# Patient Record
Sex: FEMALE | Race: ASIAN | Employment: UNEMPLOYED | URBAN - METROPOLITAN AREA
[De-identification: names, ages, dates, MRNs, and addresses within clinical notes are randomized per-mention and may not be internally consistent; named-entity substitution may affect disease eponyms.]

---

## 2017-04-13 RX ORDER — ZOLPIDEM TARTRATE 10 MG/1
TABLET ORAL
Qty: 30 TABLET | Refills: 0 | Status: SHIPPED | OUTPATIENT
Start: 2017-04-13 | End: 2017-06-16

## 2017-04-18 ENCOUNTER — TELEPHONE (OUTPATIENT)
Dept: INTERNAL MEDICINE CLINIC | Facility: CLINIC | Age: 37
End: 2017-04-18

## 2017-04-18 NOTE — TELEPHONE ENCOUNTER
Please resend ZOLPIDEM TARTRATE 10 MG Oral Tab to Rohan Lyon the pharmacist pt called and they did not receive when sent previously

## 2017-04-18 NOTE — TELEPHONE ENCOUNTER
Spoke with pharmacy they never received Zolpidiem authorized on 04/13/17 ,Authorized to fill # [id number] dated 04/13/17

## 2017-06-17 RX ORDER — ZOLPIDEM TARTRATE 10 MG/1
TABLET ORAL
Qty: 30 TABLET | Refills: 0 | Status: SHIPPED | OUTPATIENT
Start: 2017-06-17 | End: 2017-10-09

## 2017-10-09 RX ORDER — ZOLPIDEM TARTRATE 10 MG/1
TABLET ORAL
Qty: 30 TABLET | Refills: 0 | Status: SHIPPED | OUTPATIENT
Start: 2017-10-09 | End: 2017-12-04

## 2017-12-04 RX ORDER — ZOLPIDEM TARTRATE 10 MG/1
TABLET ORAL
Qty: 30 TABLET | Refills: 0 | Status: SHIPPED | OUTPATIENT
Start: 2017-12-04 | End: 2018-02-11

## 2018-02-13 RX ORDER — ZOLPIDEM TARTRATE 10 MG/1
TABLET ORAL
Qty: 30 TABLET | Refills: 0 | Status: SHIPPED | OUTPATIENT
Start: 2018-02-13 | End: 2018-04-02

## 2018-04-02 RX ORDER — ZOLPIDEM TARTRATE 10 MG/1
TABLET ORAL
Qty: 30 TABLET | Refills: 0 | Status: SHIPPED | OUTPATIENT
Start: 2018-04-02 | End: 2019-07-15 | Stop reason: ALTCHOICE

## 2018-07-06 NOTE — TELEPHONE ENCOUNTER
Requesting ZOLPIDEM TARTRATE 10 MG Oral Tab     LOV: 09/03/2015    Filled: 04/02/2018 #30 with 0 refills

## 2018-07-09 RX ORDER — ZOLPIDEM TARTRATE 10 MG/1
TABLET ORAL
Qty: 30 TABLET | Refills: 0 | OUTPATIENT
Start: 2018-07-09

## 2018-07-16 RX ORDER — ZOLPIDEM TARTRATE 10 MG/1
TABLET ORAL
Qty: 30 TABLET | Refills: 0 | OUTPATIENT
Start: 2018-07-16

## 2018-07-18 NOTE — TELEPHONE ENCOUNTER
Patient called in inquiring about her medication refill as to why it has not been filled. Mentioned to pt that her LOV was 2015 and she will need to be seen prior to any refills getting authorized. Patient understood and then disconnected the call.

## 2019-07-15 PROBLEM — F51.01 PRIMARY INSOMNIA: Status: ACTIVE | Noted: 2019-07-15

## 2019-07-15 NOTE — PROGRESS NOTES
HPI:    Patient ID: Mahesh Estrada is a 46year old female.     HPI   Last appt 2015     Here to go over insomnia   Has used the zolpidem and not helping  Anymore   Asking for alternative med to try     otherwsie OK    Review of Systems           Current Ou

## 2019-07-30 ENCOUNTER — TELEPHONE (OUTPATIENT)
Dept: INTERNAL MEDICINE CLINIC | Facility: CLINIC | Age: 39
End: 2019-07-30

## 2019-07-30 ENCOUNTER — OFFICE VISIT (OUTPATIENT)
Dept: INTERNAL MEDICINE CLINIC | Facility: CLINIC | Age: 39
End: 2019-07-30
Payer: COMMERCIAL

## 2019-07-30 VITALS
OXYGEN SATURATION: 98 % | WEIGHT: 151 LBS | HEART RATE: 68 BPM | SYSTOLIC BLOOD PRESSURE: 122 MMHG | HEIGHT: 64 IN | BODY MASS INDEX: 25.78 KG/M2 | DIASTOLIC BLOOD PRESSURE: 86 MMHG | RESPIRATION RATE: 16 BRPM

## 2019-07-30 DIAGNOSIS — Z00.00 ROUTINE GENERAL MEDICAL EXAMINATION AT A HEALTH CARE FACILITY: Primary | ICD-10-CM

## 2019-07-30 DIAGNOSIS — Z00.00 LABORATORY EXAMINATION ORDERED AS PART OF A ROUTINE GENERAL MEDICAL EXAMINATION: ICD-10-CM

## 2019-07-30 DIAGNOSIS — Z23 NEED FOR TDAP VACCINATION: ICD-10-CM

## 2019-07-30 PROCEDURE — 90471 IMMUNIZATION ADMIN: CPT | Performed by: FAMILY MEDICINE

## 2019-07-30 PROCEDURE — 99395 PREV VISIT EST AGE 18-39: CPT | Performed by: FAMILY MEDICINE

## 2019-07-30 PROCEDURE — 87624 HPV HI-RISK TYP POOLED RSLT: CPT | Performed by: FAMILY MEDICINE

## 2019-07-30 PROCEDURE — 90715 TDAP VACCINE 7 YRS/> IM: CPT | Performed by: FAMILY MEDICINE

## 2019-07-30 NOTE — PROGRESS NOTES
HPI:   Jeo Ordaz is a 46year old female who presents for a complete physical exam. Symptoms: denies discharge, itching, burning or dysuria, periods are regular, flow is 6-7 days. Patient complains none.    Regular SBE- no,Sexually active- yes,  Contra Smoker      Smokeless tobacco: Never Used    Alcohol use: No      Alcohol/week: 0.0 standard drinks    Drug use: No    Occ: homemaker. : yes . Children: 2.    Exercise: minimal.  Diet: watches minimally     REVIEW OF SYSTEMS:   GENERAL: feels well ot handouts given for: exercise, low fat diet and breast self-exam. Pt' s weight is Body mass index is 25.92 kg/m². , recommended low fat diet and aerobic exercise 30 minutes three times weekly.   The patient indicates understanding of these issues and agrees t

## 2019-07-30 NOTE — TELEPHONE ENCOUNTER
Patient stated that Temazepam is not working and she is taking up to 2 pills at night with no results. She would like to to back to taking the Zolpidem Tartrate.      Please advise

## 2019-07-31 RX ORDER — ZOLPIDEM TARTRATE 12.5 MG/1
12.5 TABLET, FILM COATED, EXTENDED RELEASE ORAL NIGHTLY PRN
Qty: 30 TABLET | Refills: 0 | COMMUNITY
Start: 2019-07-31 | End: 2019-09-03

## 2019-08-02 LAB — HPV I/H RISK 1 DNA SPEC QL NAA+PROBE: NEGATIVE

## 2019-09-03 RX ORDER — ZOLPIDEM TARTRATE 12.5 MG/1
TABLET, FILM COATED, EXTENDED RELEASE ORAL
Qty: 30 TABLET | Refills: 0 | Status: SHIPPED | OUTPATIENT
Start: 2019-09-03 | End: 2019-10-01

## 2019-09-03 RX ORDER — ZOLPIDEM TARTRATE 12.5 MG/1
12.5 TABLET, FILM COATED, EXTENDED RELEASE ORAL NIGHTLY PRN
Qty: 30 TABLET | Refills: 0 | OUTPATIENT
Start: 2019-09-03

## 2019-10-02 RX ORDER — ZOLPIDEM TARTRATE 12.5 MG/1
12.5 TABLET, FILM COATED, EXTENDED RELEASE ORAL NIGHTLY PRN
Qty: 30 TABLET | Refills: 0 | Status: SHIPPED | OUTPATIENT
Start: 2019-10-02 | End: 2019-10-30

## 2019-10-02 NOTE — TELEPHONE ENCOUNTER
Last OV: 7/30/19 with Cyrilla NOEL Higginbotham  Last refill date: 9/3/19     #/refills: #30, 0 refills  When pt was asked to return for OV: 1 year  Upcoming appt/reason: no upcoming appt  Last labs May 2015

## 2019-10-30 RX ORDER — ZOLPIDEM TARTRATE 12.5 MG/1
TABLET, FILM COATED, EXTENDED RELEASE ORAL
Qty: 30 TABLET | Refills: 0 | Status: SHIPPED | OUTPATIENT
Start: 2019-10-30 | End: 2019-11-29

## 2019-10-30 NOTE — TELEPHONE ENCOUNTER
Zolpidem ER 12.5 MG  Last OV relevant to medication: 7-30-19  Last refill date: 10-2-19  #/refills: 0  When pt was asked to return for OV: CPX 1 yr  Upcoming appt/reason: none  Recent labs: none

## 2019-11-30 NOTE — TELEPHONE ENCOUNTER
Zolpidem ER 12.5 mg  Last OV relevant to medication: 7-30-19  Last refill date: 10-30-19 #/refills: 0  When pt was asked to return for OV: CPX 1yr  Upcoming appt/reason: none  Recent labs: none

## 2019-12-01 RX ORDER — ZOLPIDEM TARTRATE 12.5 MG/1
12.5 TABLET, FILM COATED, EXTENDED RELEASE ORAL NIGHTLY PRN
Qty: 30 TABLET | Refills: 0 | Status: SHIPPED | OUTPATIENT
Start: 2019-12-01 | End: 2019-12-25

## 2019-12-26 RX ORDER — ZOLPIDEM TARTRATE 12.5 MG/1
12.5 TABLET, FILM COATED, EXTENDED RELEASE ORAL NIGHTLY PRN
Qty: 30 TABLET | Refills: 0 | Status: SHIPPED | OUTPATIENT
Start: 2019-12-26 | End: 2020-01-27

## 2019-12-26 NOTE — TELEPHONE ENCOUNTER
Zolpidem Tartrate ER 12.5 MG Oral Tab CR     Last Ov: 7/30/2019   RTC: 1 year   Last Refill:12/1/2019 # 30 tabs with 0 refills

## 2020-01-27 RX ORDER — ZOLPIDEM TARTRATE 12.5 MG/1
12.5 TABLET, FILM COATED, EXTENDED RELEASE ORAL NIGHTLY PRN
Qty: 30 TABLET | Refills: 0 | Status: SHIPPED | OUTPATIENT
Start: 2020-01-27 | End: 2020-02-26

## 2020-01-27 NOTE — TELEPHONE ENCOUNTER
Zolpidem 12.5 Mg  Last OV relevant to medication: 7-3-19  Last refill date: 12-26-19 #/refills: 0  When pt was asked to return for OV: CPX 1yr  Upcoming appt/reason: none  Recent labs: none

## 2020-02-26 RX ORDER — ZOLPIDEM TARTRATE 12.5 MG/1
12.5 TABLET, FILM COATED, EXTENDED RELEASE ORAL NIGHTLY PRN
Qty: 30 TABLET | Refills: 0 | Status: SHIPPED | OUTPATIENT
Start: 2020-02-26 | End: 2020-03-25

## 2020-02-26 NOTE — TELEPHONE ENCOUNTER
Zolpidem Tartrate ER 12.5 MG Oral Tab CR    Last OV relevant to medication: 7/30/2019  Last refill date: 1/27/2020     #/refills: #30 w/ 0 refills   When pt was asked to return for OV: 1 year   Upcoming appt/reason: no future appointments

## 2020-03-25 RX ORDER — ZOLPIDEM TARTRATE 12.5 MG/1
12.5 TABLET, FILM COATED, EXTENDED RELEASE ORAL NIGHTLY PRN
Qty: 30 TABLET | Refills: 0 | Status: SHIPPED | OUTPATIENT
Start: 2020-03-25 | End: 2020-04-24

## 2020-03-25 NOTE — TELEPHONE ENCOUNTER
Zolpidem Tartrate ER 12.5 MG Oral Tab CR    Last OV relevant to medication: 7/30/2019  Last refill date: 2/26/2020     #/refills: #30 w/ 0 refills   When pt was asked to return for OV: 1 year   Upcoming appt/reason: no future appointments

## 2020-04-25 RX ORDER — ZOLPIDEM TARTRATE 12.5 MG/1
12.5 TABLET, FILM COATED, EXTENDED RELEASE ORAL NIGHTLY PRN
Qty: 30 TABLET | Refills: 0 | Status: SHIPPED | OUTPATIENT
Start: 2020-04-25 | End: 2020-05-20

## 2020-05-21 RX ORDER — ZOLPIDEM TARTRATE 12.5 MG/1
12.5 TABLET, FILM COATED, EXTENDED RELEASE ORAL NIGHTLY PRN
Qty: 30 TABLET | Refills: 1 | Status: SHIPPED | OUTPATIENT
Start: 2020-05-21 | End: 2020-06-23

## 2020-05-21 RX ORDER — ZOLPIDEM TARTRATE 12.5 MG/1
12.5 TABLET, FILM COATED, EXTENDED RELEASE ORAL NIGHTLY PRN
Qty: 30 TABLET | Refills: 0 | OUTPATIENT
Start: 2020-05-21

## 2020-06-24 RX ORDER — ZOLPIDEM TARTRATE 12.5 MG/1
12.5 TABLET, FILM COATED, EXTENDED RELEASE ORAL NIGHTLY PRN
Qty: 30 TABLET | Refills: 0 | Status: SHIPPED | OUTPATIENT
Start: 2020-06-24 | End: 2020-07-25

## 2020-06-24 NOTE — TELEPHONE ENCOUNTER
Zolpidem tartrate ER 12.5mg last filled 5/21/20 #30 with 1 refill    LOV 7/30/19     Upcoming appt 8/3/20

## 2020-07-23 RX ORDER — ZOLPIDEM TARTRATE 12.5 MG/1
12.5 TABLET, FILM COATED, EXTENDED RELEASE ORAL NIGHTLY PRN
Qty: 30 TABLET | Refills: 0 | OUTPATIENT
Start: 2020-07-23

## 2020-07-24 NOTE — TELEPHONE ENCOUNTER
Patient has an appointment for her physical on 08/03. Patient is out of her medication and would like it called in ASAP.

## 2020-07-25 RX ORDER — ZOLPIDEM TARTRATE 12.5 MG/1
12.5 TABLET, FILM COATED, EXTENDED RELEASE ORAL NIGHTLY PRN
Qty: 30 TABLET | Refills: 0 | Status: SHIPPED | OUTPATIENT
Start: 2020-07-25 | End: 2020-08-23

## 2020-07-25 NOTE — TELEPHONE ENCOUNTER
Pt has scheduled appointment    Future Appointments   Date Time Provider Christian Joy   8/3/2020  1:00 PM Penelope Barrow MD [facility] [place]

## 2020-08-03 ENCOUNTER — OFFICE VISIT (OUTPATIENT)
Dept: INTERNAL MEDICINE CLINIC | Facility: CLINIC | Age: 40
End: 2020-08-03
Payer: COMMERCIAL

## 2020-08-03 VITALS
HEART RATE: 68 BPM | BODY MASS INDEX: 25.7 KG/M2 | DIASTOLIC BLOOD PRESSURE: 78 MMHG | WEIGHT: 150.5 LBS | OXYGEN SATURATION: 99 % | HEIGHT: 64 IN | SYSTOLIC BLOOD PRESSURE: 120 MMHG | TEMPERATURE: 98 F | RESPIRATION RATE: 16 BRPM

## 2020-08-03 DIAGNOSIS — Z00.00 WELLNESS EXAMINATION: Primary | ICD-10-CM

## 2020-08-03 DIAGNOSIS — Z00.00 LABORATORY EXAM ORDERED AS PART OF ROUTINE GENERAL MEDICAL EXAMINATION: ICD-10-CM

## 2020-08-03 PROCEDURE — 3008F BODY MASS INDEX DOCD: CPT | Performed by: FAMILY MEDICINE

## 2020-08-03 PROCEDURE — 3074F SYST BP LT 130 MM HG: CPT | Performed by: FAMILY MEDICINE

## 2020-08-03 PROCEDURE — 99395 PREV VISIT EST AGE 18-39: CPT | Performed by: FAMILY MEDICINE

## 2020-08-03 PROCEDURE — 3078F DIAST BP <80 MM HG: CPT | Performed by: FAMILY MEDICINE

## 2020-08-03 RX ORDER — ZOLPIDEM TARTRATE 12.5 MG/1
12.5 TABLET, FILM COATED, EXTENDED RELEASE ORAL NIGHTLY PRN
Qty: 30 TABLET | Refills: 0 | Status: CANCELLED | OUTPATIENT
Start: 2020-08-03

## 2020-08-03 RX ORDER — TEMAZEPAM 15 MG/1
15 CAPSULE ORAL NIGHTLY PRN
Qty: 30 CAPSULE | Refills: 0 | Status: SHIPPED | OUTPATIENT
Start: 2020-08-03 | End: 2021-03-19 | Stop reason: ALTCHOICE

## 2020-08-03 NOTE — PROGRESS NOTES
HPI:   Kamran Jackson is a 45year old female who presents for a complete physical exam.     Wt Readings from Last 6 Encounters:  08/03/20 : 150 lb 8 oz (68.3 kg)  07/30/19 : 151 lb (68.5 kg)  07/15/19 : 152 lb (68.9 kg)  09/03/15 : 118 lb (53.5 kg)  04/30 bruising  ENDOCRINE: denies thyroid history  ALL/ASTHMA: denies hx of allergy or asthma    EXAM:   /78 (BP Location: Left arm, Patient Position: Sitting, Cuff Size: adult)   Pulse 68   Temp 98.3 °F (36.8 °C) (Oral)   Resp 16   Ht 64\"   Wt 150 lb 8 o

## 2020-08-06 ENCOUNTER — LAB ENCOUNTER (OUTPATIENT)
Dept: LAB | Age: 40
End: 2020-08-06
Attending: FAMILY MEDICINE
Payer: COMMERCIAL

## 2020-08-06 DIAGNOSIS — Z00.00 LABORATORY EXAM ORDERED AS PART OF ROUTINE GENERAL MEDICAL EXAMINATION: ICD-10-CM

## 2020-08-06 DIAGNOSIS — Z00.00 WELLNESS EXAMINATION: ICD-10-CM

## 2020-08-06 LAB
ALBUMIN SERPL-MCNC: 4 G/DL (ref 3.4–5)
ALBUMIN/GLOB SERPL: 1 {RATIO} (ref 1–2)
ALP LIVER SERPL-CCNC: 40 U/L (ref 37–98)
ALT SERPL-CCNC: 19 U/L (ref 13–56)
ANION GAP SERPL CALC-SCNC: 3 MMOL/L (ref 0–18)
AST SERPL-CCNC: 16 U/L (ref 15–37)
BASOPHILS # BLD AUTO: 0.06 X10(3) UL (ref 0–0.2)
BASOPHILS NFR BLD AUTO: 0.7 %
BILIRUB SERPL-MCNC: 0.5 MG/DL (ref 0.1–2)
BILIRUB UR QL STRIP.AUTO: NEGATIVE
BUN BLD-MCNC: 9 MG/DL (ref 7–18)
BUN/CREAT SERPL: 12.2 (ref 10–20)
CALCIUM BLD-MCNC: 9.6 MG/DL (ref 8.5–10.1)
CHLORIDE SERPL-SCNC: 106 MMOL/L (ref 98–112)
CHOLEST SMN-MCNC: 209 MG/DL (ref ?–200)
CLARITY UR REFRACT.AUTO: CLEAR
CO2 SERPL-SCNC: 27 MMOL/L (ref 21–32)
CREAT BLD-MCNC: 0.74 MG/DL (ref 0.55–1.02)
DEPRECATED RDW RBC AUTO: 40.3 FL (ref 35.1–46.3)
EOSINOPHIL # BLD AUTO: 0.04 X10(3) UL (ref 0–0.7)
EOSINOPHIL NFR BLD AUTO: 0.5 %
ERYTHROCYTE [DISTWIDTH] IN BLOOD BY AUTOMATED COUNT: 11.9 % (ref 11–15)
GLOBULIN PLAS-MCNC: 4.1 G/DL (ref 2.8–4.4)
GLUCOSE BLD-MCNC: 85 MG/DL (ref 70–99)
GLUCOSE UR STRIP.AUTO-MCNC: NEGATIVE MG/DL
HCT VFR BLD AUTO: 41.6 % (ref 35–48)
HDLC SERPL-MCNC: 48 MG/DL (ref 40–59)
HGB BLD-MCNC: 13.2 G/DL (ref 12–16)
IMM GRANULOCYTES # BLD AUTO: 0.02 X10(3) UL (ref 0–1)
IMM GRANULOCYTES NFR BLD: 0.2 %
KETONES UR STRIP.AUTO-MCNC: NEGATIVE MG/DL
LDLC SERPL CALC-MCNC: 150 MG/DL (ref ?–100)
LEUKOCYTE ESTERASE UR QL STRIP.AUTO: NEGATIVE
LYMPHOCYTES # BLD AUTO: 2.38 X10(3) UL (ref 1–4)
LYMPHOCYTES NFR BLD AUTO: 29 %
M PROTEIN MFR SERPL ELPH: 8.1 G/DL (ref 6.4–8.2)
MCH RBC QN AUTO: 29.3 PG (ref 26–34)
MCHC RBC AUTO-ENTMCNC: 31.7 G/DL (ref 31–37)
MCV RBC AUTO: 92.2 FL (ref 80–100)
MONOCYTES # BLD AUTO: 0.65 X10(3) UL (ref 0.1–1)
MONOCYTES NFR BLD AUTO: 7.9 %
NEUTROPHILS # BLD AUTO: 5.05 X10 (3) UL (ref 1.5–7.7)
NEUTROPHILS # BLD AUTO: 5.05 X10(3) UL (ref 1.5–7.7)
NEUTROPHILS NFR BLD AUTO: 61.7 %
NITRITE UR QL STRIP.AUTO: NEGATIVE
NONHDLC SERPL-MCNC: 161 MG/DL (ref ?–130)
OSMOLALITY SERPL CALC.SUM OF ELEC: 280 MOSM/KG (ref 275–295)
PATIENT FASTING Y/N/NP: NO
PATIENT FASTING Y/N/NP: NO
PH UR STRIP.AUTO: 6 [PH] (ref 4.5–8)
PLATELET # BLD AUTO: 275 10(3)UL (ref 150–450)
POTASSIUM SERPL-SCNC: 4.4 MMOL/L (ref 3.5–5.1)
PROT UR STRIP.AUTO-MCNC: NEGATIVE MG/DL
RBC # BLD AUTO: 4.51 X10(6)UL (ref 3.8–5.3)
SODIUM SERPL-SCNC: 136 MMOL/L (ref 136–145)
SP GR UR STRIP.AUTO: <1.005 (ref 1–1.03)
TRIGL SERPL-MCNC: 53 MG/DL (ref 30–149)
TSI SER-ACNC: 2.14 MIU/ML (ref 0.36–3.74)
UROBILINOGEN UR STRIP.AUTO-MCNC: <2 MG/DL
VLDLC SERPL CALC-MCNC: 11 MG/DL (ref 0–30)
WBC # BLD AUTO: 8.2 X10(3) UL (ref 4–11)

## 2020-08-06 PROCEDURE — 36415 COLL VENOUS BLD VENIPUNCTURE: CPT

## 2020-08-06 PROCEDURE — 84443 ASSAY THYROID STIM HORMONE: CPT

## 2020-08-06 PROCEDURE — 81001 URINALYSIS AUTO W/SCOPE: CPT

## 2020-08-06 PROCEDURE — 85025 COMPLETE CBC W/AUTO DIFF WBC: CPT

## 2020-08-06 PROCEDURE — 80061 LIPID PANEL: CPT

## 2020-08-06 PROCEDURE — 80053 COMPREHEN METABOLIC PANEL: CPT

## 2020-08-24 RX ORDER — ZOLPIDEM TARTRATE 12.5 MG/1
12.5 TABLET, FILM COATED, EXTENDED RELEASE ORAL NIGHTLY PRN
Qty: 30 TABLET | Refills: 0 | Status: SHIPPED | OUTPATIENT
Start: 2020-08-24 | End: 2020-09-22

## 2020-09-22 RX ORDER — ZOLPIDEM TARTRATE 12.5 MG/1
12.5 TABLET, FILM COATED, EXTENDED RELEASE ORAL NIGHTLY PRN
Qty: 30 TABLET | Refills: 0 | Status: SHIPPED | OUTPATIENT
Start: 2020-09-22 | End: 2020-10-20

## 2020-09-22 NOTE — TELEPHONE ENCOUNTER
Zolpidem ER 12.5 mg  Last OV relevant to medication: 8-3-20  Last refill date: 8-24-20 #/refills: 0  When pt was asked to return for OV: CPX 1yr  Upcoming appt/reason: none  Recent labs: none no fever and no chills.

## 2020-10-21 RX ORDER — ZOLPIDEM TARTRATE 12.5 MG/1
12.5 TABLET, FILM COATED, EXTENDED RELEASE ORAL NIGHTLY PRN
Qty: 30 TABLET | Refills: 0 | Status: SHIPPED | OUTPATIENT
Start: 2020-10-21 | End: 2020-11-20

## 2020-10-21 NOTE — TELEPHONE ENCOUNTER
Zolpidem Tartrate ER 12.5mg last filled 9/22/20  #30    LOV 8/3/20 - wellness visit with Dr. Jennie Sherman

## 2020-11-21 RX ORDER — ZOLPIDEM TARTRATE 12.5 MG/1
12.5 TABLET, FILM COATED, EXTENDED RELEASE ORAL NIGHTLY PRN
Qty: 30 TABLET | Refills: 0 | Status: SHIPPED | OUTPATIENT
Start: 2020-11-21 | End: 2020-12-20

## 2020-12-21 RX ORDER — ZOLPIDEM TARTRATE 12.5 MG/1
12.5 TABLET, FILM COATED, EXTENDED RELEASE ORAL NIGHTLY PRN
Qty: 30 TABLET | Refills: 0 | Status: SHIPPED | OUTPATIENT
Start: 2020-12-21 | End: 2021-01-19

## 2020-12-21 NOTE — TELEPHONE ENCOUNTER
Patient called to see if she can get her medication today she is leaving town tomorrow.       RequestingZolpidem Tartrate ER 12.5 MG Oral Tab   LOV: 08/03/2020  RTC: asked to return for CPX in 1yr  Last Relevant Labs: 8/06/2020  Filled:11/21/2020  #30 with

## 2021-01-19 RX ORDER — ZOLPIDEM TARTRATE 12.5 MG/1
12.5 TABLET, FILM COATED, EXTENDED RELEASE ORAL NIGHTLY PRN
Qty: 30 TABLET | Refills: 0 | Status: SHIPPED | OUTPATIENT
Start: 2021-01-19 | End: 2021-02-18

## 2021-01-19 NOTE — TELEPHONE ENCOUNTER
Zolpidem Tartrate ER 12.5 mg last filled 12/21/20     LOV 8/3/20 -  Wellness visit     Last labs  8/6/20

## 2021-02-18 RX ORDER — ZOLPIDEM TARTRATE 12.5 MG/1
12.5 TABLET, FILM COATED, EXTENDED RELEASE ORAL NIGHTLY PRN
Qty: 30 TABLET | Refills: 0 | Status: SHIPPED | OUTPATIENT
Start: 2021-02-18 | End: 2021-03-18

## 2021-03-19 RX ORDER — ZOLPIDEM TARTRATE 12.5 MG/1
12.5 TABLET, FILM COATED, EXTENDED RELEASE ORAL NIGHTLY PRN
Qty: 30 TABLET | Refills: 0 | Status: SHIPPED | OUTPATIENT
Start: 2021-03-19 | End: 2021-04-19

## 2021-03-19 NOTE — TELEPHONE ENCOUNTER
LOV: 8/3/2020 with Dr. Alessandra Walsh   RTC: 1 year  Last Relevant Labs: 8/6/2020   Filled: 2/18/2021   #30 with 0 refills    No future appointments.

## 2021-04-20 RX ORDER — ZOLPIDEM TARTRATE 12.5 MG/1
12.5 TABLET, FILM COATED, EXTENDED RELEASE ORAL NIGHTLY PRN
Qty: 30 TABLET | Refills: 0 | Status: SHIPPED | OUTPATIENT
Start: 2021-04-20 | End: 2021-05-16

## 2021-04-20 NOTE — TELEPHONE ENCOUNTER
Requesting Zolpidem Tartrate ER 12.5 MG Oral Tab CR  LOV: 8/3/20  RTC: 1 year for CPX  Last Relevant Labs: 8/6/20  Filled: 3/19/21 #30 with 0 refills    No future appointments.

## 2021-05-17 RX ORDER — ZOLPIDEM TARTRATE 12.5 MG/1
12.5 TABLET, FILM COATED, EXTENDED RELEASE ORAL NIGHTLY PRN
Qty: 30 TABLET | Refills: 0 | Status: SHIPPED | OUTPATIENT
Start: 2021-05-17 | End: 2021-06-16

## 2021-05-17 NOTE — TELEPHONE ENCOUNTER
Medication(s) to Refill:   Requested Prescriptions     Pending Prescriptions Disp Refills   • Zolpidem Tartrate ER 12.5 MG Oral Tab CR 30 tablet 0     Sig: Take 1 tablet (12.5 mg total) by mouth nightly as needed for Sleep.        LOV: 8-3-2020    RTC:  1 y

## 2021-06-16 RX ORDER — ZOLPIDEM TARTRATE 12.5 MG/1
12.5 TABLET, FILM COATED, EXTENDED RELEASE ORAL NIGHTLY PRN
Qty: 30 TABLET | Refills: 0 | Status: SHIPPED | OUTPATIENT
Start: 2021-06-16 | End: 2021-07-14

## 2021-06-16 NOTE — TELEPHONE ENCOUNTER
LOV: 8/3/2020 with Dr. Burke Ortiz  RTC: 1 year  Last Relevant Labs: 8/6/2020   Filled: 5/17/2021  #30 with 0 refills    No future appointments.

## 2021-07-15 RX ORDER — ZOLPIDEM TARTRATE 12.5 MG/1
12.5 TABLET, FILM COATED, EXTENDED RELEASE ORAL NIGHTLY PRN
Qty: 30 TABLET | Refills: 0 | Status: SHIPPED | OUTPATIENT
Start: 2021-07-15 | End: 2021-08-13

## 2021-08-13 RX ORDER — ZOLPIDEM TARTRATE 12.5 MG/1
12.5 TABLET, FILM COATED, EXTENDED RELEASE ORAL NIGHTLY PRN
Qty: 30 TABLET | Refills: 0 | Status: SHIPPED | OUTPATIENT
Start: 2021-08-13 | End: 2021-09-10

## 2021-08-13 NOTE — TELEPHONE ENCOUNTER
LOV: 8/3/2020 with Dr. Jolene Erazo  RTC: 1 year  Last Relevant Labs: 8/6/2020  Filled: 7/15/2021   #30 with 0 refills    No future appointments.

## 2021-09-10 RX ORDER — ZOLPIDEM TARTRATE 12.5 MG/1
12.5 TABLET, FILM COATED, EXTENDED RELEASE ORAL NIGHTLY PRN
Qty: 30 TABLET | Refills: 0 | Status: SHIPPED | OUTPATIENT
Start: 2021-09-10

## 2021-09-10 NOTE — TELEPHONE ENCOUNTER
Zolpidem Tartrate ER 12.5 MG Oral Tab CR          Sig: Take 1 tablet (12.5 mg total) by mouth nightly as needed for Sleep.     Disp:  30 tablet    Refills:  0    Start: 9/10/2021    Class: Normal    Non-formulary    Last ordered: 4 weeks ago by Quinn Scherer

## 2021-09-10 NOTE — TELEPHONE ENCOUNTER
Future Appointments   Date Time Provider Christian Saxenai   9/15/2021  3:00 PM Sharona Ruth MD [facility] [place]     Requesting temp refill

## 2021-09-16 ENCOUNTER — OFFICE VISIT (OUTPATIENT)
Dept: INTERNAL MEDICINE CLINIC | Facility: CLINIC | Age: 41
End: 2021-09-16
Payer: COMMERCIAL

## 2021-09-16 VITALS
HEIGHT: 64 IN | BODY MASS INDEX: 24.99 KG/M2 | OXYGEN SATURATION: 97 % | WEIGHT: 146.38 LBS | RESPIRATION RATE: 22 BRPM | HEART RATE: 77 BPM

## 2021-09-16 DIAGNOSIS — Z12.31 VISIT FOR SCREENING MAMMOGRAM: ICD-10-CM

## 2021-09-16 DIAGNOSIS — Z00.00 WELLNESS EXAMINATION: Primary | ICD-10-CM

## 2021-09-16 DIAGNOSIS — Z00.00 LABORATORY EXAM ORDERED AS PART OF ROUTINE GENERAL MEDICAL EXAMINATION: ICD-10-CM

## 2021-09-16 PROCEDURE — 99213 OFFICE O/P EST LOW 20 MIN: CPT | Performed by: FAMILY MEDICINE

## 2021-09-16 PROCEDURE — 3008F BODY MASS INDEX DOCD: CPT | Performed by: FAMILY MEDICINE

## 2021-09-16 PROCEDURE — 99396 PREV VISIT EST AGE 40-64: CPT | Performed by: FAMILY MEDICINE

## 2021-09-16 RX ORDER — SERTRALINE HYDROCHLORIDE 25 MG/1
25 TABLET, FILM COATED ORAL DAILY
Qty: 30 TABLET | Refills: 0 | Status: SHIPPED | OUTPATIENT
Start: 2021-09-16

## 2021-09-16 RX ORDER — TRAZODONE HYDROCHLORIDE 50 MG/1
50 TABLET ORAL NIGHTLY
Qty: 30 TABLET | Refills: 0 | Status: SHIPPED | OUTPATIENT
Start: 2021-09-16 | End: 2021-10-12

## 2021-09-16 NOTE — PROGRESS NOTES
HPI:   General Max is a 37year old female who presents for a complete physical exam.     Wt Readings from Last 6 Encounters:  09/16/21 : 146 lb 6.4 oz (66.4 kg)  08/03/20 : 150 lb 8 oz (68.3 kg)  07/30/19 : 151 lb (68.5 kg)  07/15/19 : 152 lb (68.9 kg) headaches  PSYCHE: sleep meds not helping  Wakes up a lot still  Feel sunder a lot of stress w taking care of the family, chores,      doesnot have time to do all the things she needs   Has no free time   Worries about things all the time     HEMATO

## 2021-10-13 RX ORDER — TRAZODONE HYDROCHLORIDE 50 MG/1
50 TABLET ORAL NIGHTLY
Qty: 30 TABLET | Refills: 0 | Status: SHIPPED | OUTPATIENT
Start: 2021-10-13 | End: 2021-11-09

## 2021-10-13 NOTE — TELEPHONE ENCOUNTER
traZODone 50 MG Oral Tab          Sig: Take 1 tablet (50 mg total) by mouth nightly.     Disp:  30 tablet    Refills:  0    Start: 10/12/2021    Class: Normal    Non-formulary    Last ordered: 3 weeks ago by Caitlin Corea MD            Last OV: 9/16/2021

## 2021-11-09 RX ORDER — TRAZODONE HYDROCHLORIDE 50 MG/1
50 TABLET ORAL NIGHTLY
Qty: 30 TABLET | Refills: 0 | Status: SHIPPED | OUTPATIENT
Start: 2021-11-09 | End: 2021-12-08

## 2021-12-08 RX ORDER — TRAZODONE HYDROCHLORIDE 50 MG/1
50 TABLET ORAL NIGHTLY
Qty: 30 TABLET | Refills: 0 | Status: SHIPPED | OUTPATIENT
Start: 2021-12-08 | End: 2022-01-07

## 2021-12-08 NOTE — TELEPHONE ENCOUNTER
traZODone 50 MG Oral Tab          Sig: Take 1 tablet (50 mg total) by mouth nightly.     Disp:  30 tablet    Refills:  0    Start: 12/8/2021    Class: Normal    Non-formulary    Last ordered: 4 weeks ago by Cassi Waters MD          Last OV: 9/16/2021     L

## 2022-01-07 RX ORDER — TRAZODONE HYDROCHLORIDE 50 MG/1
50 TABLET ORAL NIGHTLY
Qty: 30 TABLET | Refills: 0 | Status: SHIPPED | OUTPATIENT
Start: 2022-01-07

## 2022-02-08 RX ORDER — TRAZODONE HYDROCHLORIDE 50 MG/1
50 TABLET ORAL NIGHTLY
Qty: 30 TABLET | Refills: 0 | Status: SHIPPED | OUTPATIENT
Start: 2022-02-08 | End: 2022-03-09

## 2022-03-09 RX ORDER — TRAZODONE HYDROCHLORIDE 50 MG/1
50 TABLET ORAL NIGHTLY
Qty: 30 TABLET | Refills: 0 | Status: SHIPPED | OUTPATIENT
Start: 2022-03-09

## 2022-04-06 RX ORDER — TRAZODONE HYDROCHLORIDE 50 MG/1
50 TABLET ORAL NIGHTLY
Qty: 30 TABLET | Refills: 0 | Status: SHIPPED | OUTPATIENT
Start: 2022-04-06

## 2022-05-04 RX ORDER — TRAZODONE HYDROCHLORIDE 50 MG/1
50 TABLET ORAL NIGHTLY
Qty: 30 TABLET | Refills: 0 | Status: SHIPPED | OUTPATIENT
Start: 2022-05-04

## 2022-06-01 RX ORDER — TRAZODONE HYDROCHLORIDE 50 MG/1
50 TABLET ORAL NIGHTLY
Qty: 30 TABLET | Refills: 0 | Status: SHIPPED | OUTPATIENT
Start: 2022-06-01

## 2022-06-01 RX ORDER — TRAZODONE HYDROCHLORIDE 50 MG/1
TABLET ORAL
Qty: 30 TABLET | Refills: 0 | OUTPATIENT
Start: 2022-06-01

## 2022-06-11 ENCOUNTER — HOSPITAL ENCOUNTER (INPATIENT)
Facility: HOSPITAL | Age: 42
LOS: 1 days | Discharge: ANOTHER HOSPITAL | End: 2022-06-13
Attending: EMERGENCY MEDICINE | Admitting: HOSPITALIST
Payer: COMMERCIAL

## 2022-06-11 ENCOUNTER — HOSPITAL ENCOUNTER (OUTPATIENT)
Age: 42
Discharge: ANOTHER HOSPITAL | End: 2022-06-11
Attending: EMERGENCY MEDICINE
Payer: COMMERCIAL

## 2022-06-11 ENCOUNTER — APPOINTMENT (OUTPATIENT)
Dept: GENERAL RADIOLOGY | Facility: HOSPITAL | Age: 42
End: 2022-06-11
Attending: EMERGENCY MEDICINE
Payer: COMMERCIAL

## 2022-06-11 VITALS
OXYGEN SATURATION: 99 % | BODY MASS INDEX: 23.9 KG/M2 | HEART RATE: 78 BPM | WEIGHT: 140 LBS | HEIGHT: 64 IN | RESPIRATION RATE: 14 BRPM | DIASTOLIC BLOOD PRESSURE: 69 MMHG | TEMPERATURE: 99 F | SYSTOLIC BLOOD PRESSURE: 126 MMHG

## 2022-06-11 DIAGNOSIS — R21 RASH: Primary | ICD-10-CM

## 2022-06-11 DIAGNOSIS — K12.1 ORAL ULCER: Primary | ICD-10-CM

## 2022-06-11 DIAGNOSIS — R13.10 ODYNOPHAGIA: ICD-10-CM

## 2022-06-11 DIAGNOSIS — T78.40XD ALLERGIC REACTION, SUBSEQUENT ENCOUNTER: ICD-10-CM

## 2022-06-11 LAB
ALBUMIN SERPL-MCNC: 3.3 G/DL (ref 3.4–5)
ALBUMIN/GLOB SERPL: 0.9 {RATIO} (ref 1–2)
ALP LIVER SERPL-CCNC: 36 U/L
ALT SERPL-CCNC: 26 U/L
ANION GAP SERPL CALC-SCNC: 3 MMOL/L (ref 0–18)
AST SERPL-CCNC: 27 U/L (ref 15–37)
B-HCG SERPL-ACNC: <1 MIU/ML
BASOPHILS # BLD AUTO: 0.02 X10(3) UL (ref 0–0.2)
BASOPHILS NFR BLD AUTO: 0.2 %
BILIRUB SERPL-MCNC: 0.3 MG/DL (ref 0.1–2)
BUN BLD-MCNC: 12 MG/DL (ref 7–18)
CALCIUM BLD-MCNC: 8.2 MG/DL (ref 8.5–10.1)
CHLORIDE SERPL-SCNC: 107 MMOL/L (ref 98–112)
CO2 SERPL-SCNC: 26 MMOL/L (ref 21–32)
CREAT BLD-MCNC: 0.8 MG/DL
EOSINOPHIL # BLD AUTO: 0.1 X10(3) UL (ref 0–0.7)
EOSINOPHIL NFR BLD AUTO: 1.1 %
ERYTHROCYTE [DISTWIDTH] IN BLOOD BY AUTOMATED COUNT: 12 %
GLOBULIN PLAS-MCNC: 3.8 G/DL (ref 2.8–4.4)
GLUCOSE BLD-MCNC: 106 MG/DL (ref 70–99)
HCT VFR BLD AUTO: 37.5 %
HETEROPH AB SER QL: NEGATIVE
HGB BLD-MCNC: 12.2 G/DL
IMM GRANULOCYTES # BLD AUTO: 0.02 X10(3) UL (ref 0–1)
IMM GRANULOCYTES NFR BLD: 0.2 %
LYMPHOCYTES # BLD AUTO: 0.9 X10(3) UL (ref 1–4)
LYMPHOCYTES NFR BLD AUTO: 10.1 %
MCH RBC QN AUTO: 29.5 PG (ref 26–34)
MCHC RBC AUTO-ENTMCNC: 32.5 G/DL (ref 31–37)
MCV RBC AUTO: 90.8 FL
MONOCYTES # BLD AUTO: 0.26 X10(3) UL (ref 0.1–1)
MONOCYTES NFR BLD AUTO: 2.9 %
NEUTROPHILS # BLD AUTO: 7.61 X10 (3) UL (ref 1.5–7.7)
NEUTROPHILS # BLD AUTO: 7.61 X10(3) UL (ref 1.5–7.7)
NEUTROPHILS NFR BLD AUTO: 85.5 %
OSMOLALITY SERPL CALC.SUM OF ELEC: 282 MOSM/KG (ref 275–295)
PLATELET # BLD AUTO: 160 10(3)UL (ref 150–450)
POTASSIUM SERPL-SCNC: 4 MMOL/L (ref 3.5–5.1)
PROT SERPL-MCNC: 7.1 G/DL (ref 6.4–8.2)
RBC # BLD AUTO: 4.13 X10(6)UL
SARS-COV-2 RNA RESP QL NAA+PROBE: NOT DETECTED
SARS-COV-2 RNA RESP QL NAA+PROBE: NOT DETECTED
SODIUM SERPL-SCNC: 136 MMOL/L (ref 136–145)
WBC # BLD AUTO: 8.9 X10(3) UL (ref 4–11)

## 2022-06-11 PROCEDURE — 96375 TX/PRO/DX INJ NEW DRUG ADDON: CPT

## 2022-06-11 PROCEDURE — 96374 THER/PROPH/DIAG INJ IV PUSH: CPT

## 2022-06-11 PROCEDURE — 99214 OFFICE O/P EST MOD 30 MIN: CPT

## 2022-06-11 PROCEDURE — S0028 INJECTION, FAMOTIDINE, 20 MG: HCPCS

## 2022-06-11 PROCEDURE — 71045 X-RAY EXAM CHEST 1 VIEW: CPT | Performed by: EMERGENCY MEDICINE

## 2022-06-11 PROCEDURE — 99222 1ST HOSP IP/OBS MODERATE 55: CPT | Performed by: HOSPITALIST

## 2022-06-11 PROCEDURE — 96361 HYDRATE IV INFUSION ADD-ON: CPT

## 2022-06-11 RX ORDER — DIPHENHYDRAMINE HYDROCHLORIDE 50 MG/ML
12.5 INJECTION INTRAMUSCULAR; INTRAVENOUS EVERY 4 HOURS PRN
Status: DISCONTINUED | OUTPATIENT
Start: 2022-06-11 | End: 2022-06-13

## 2022-06-11 RX ORDER — SODIUM CHLORIDE 9 MG/ML
1000 INJECTION, SOLUTION INTRAVENOUS ONCE
Status: COMPLETED | OUTPATIENT
Start: 2022-06-11 | End: 2022-06-12

## 2022-06-11 RX ORDER — SODIUM CHLORIDE 9 MG/ML
INJECTION, SOLUTION INTRAVENOUS CONTINUOUS
Status: DISCONTINUED | OUTPATIENT
Start: 2022-06-11 | End: 2022-06-13

## 2022-06-11 RX ORDER — DIPHENHYDRAMINE HYDROCHLORIDE 50 MG/ML
25 INJECTION INTRAMUSCULAR; INTRAVENOUS ONCE
Status: COMPLETED | OUTPATIENT
Start: 2022-06-11 | End: 2022-06-11

## 2022-06-11 RX ORDER — ZOLPIDEM TARTRATE 5 MG/1
5 TABLET ORAL NIGHTLY PRN
Refills: 0 | Status: DISCONTINUED | OUTPATIENT
Start: 2022-06-11 | End: 2022-06-13

## 2022-06-11 RX ORDER — DIPHENHYDRAMINE HYDROCHLORIDE 50 MG/ML
25 INJECTION INTRAMUSCULAR; INTRAVENOUS EVERY 4 HOURS PRN
Status: DISCONTINUED | OUTPATIENT
Start: 2022-06-11 | End: 2022-06-11

## 2022-06-11 RX ORDER — HEPARIN SODIUM 5000 [USP'U]/ML
5000 INJECTION, SOLUTION INTRAVENOUS; SUBCUTANEOUS EVERY 8 HOURS SCHEDULED
Status: DISCONTINUED | OUTPATIENT
Start: 2022-06-11 | End: 2022-06-13

## 2022-06-11 RX ORDER — MELATONIN
3 NIGHTLY PRN
Status: DISCONTINUED | OUTPATIENT
Start: 2022-06-11 | End: 2022-06-13

## 2022-06-11 RX ORDER — METHYLPREDNISOLONE SODIUM SUCCINATE 125 MG/2ML
125 INJECTION, POWDER, LYOPHILIZED, FOR SOLUTION INTRAMUSCULAR; INTRAVENOUS ONCE
Status: COMPLETED | OUTPATIENT
Start: 2022-06-11 | End: 2022-06-11

## 2022-06-11 RX ORDER — DIPHENHYDRAMINE HCL 25 MG
25 CAPSULE ORAL EVERY 4 HOURS PRN
Status: DISCONTINUED | OUTPATIENT
Start: 2022-06-11 | End: 2022-06-13

## 2022-06-11 RX ORDER — SODIUM CHLORIDE 9 MG/ML
INJECTION, SOLUTION INTRAVENOUS CONTINUOUS
Status: DISCONTINUED | OUTPATIENT
Start: 2022-06-11 | End: 2022-06-11

## 2022-06-11 RX ORDER — FAMOTIDINE 10 MG/ML
20 INJECTION, SOLUTION INTRAVENOUS ONCE
Status: COMPLETED | OUTPATIENT
Start: 2022-06-11 | End: 2022-06-11

## 2022-06-11 RX ORDER — KETOROLAC TROMETHAMINE 30 MG/ML
30 INJECTION, SOLUTION INTRAMUSCULAR; INTRAVENOUS ONCE
Status: COMPLETED | OUTPATIENT
Start: 2022-06-11 | End: 2022-06-11

## 2022-06-11 RX ORDER — METHYLPREDNISOLONE SODIUM SUCCINATE 125 MG/2ML
60 INJECTION, POWDER, LYOPHILIZED, FOR SOLUTION INTRAMUSCULAR; INTRAVENOUS EVERY 8 HOURS
Status: DISCONTINUED | OUTPATIENT
Start: 2022-06-11 | End: 2022-06-13

## 2022-06-11 RX ORDER — SERTRALINE HYDROCHLORIDE 25 MG/1
25 TABLET, FILM COATED ORAL DAILY
Status: DISCONTINUED | OUTPATIENT
Start: 2022-06-11 | End: 2022-06-12

## 2022-06-11 RX ORDER — TRAZODONE HYDROCHLORIDE 50 MG/1
50 TABLET ORAL NIGHTLY
Status: DISCONTINUED | OUTPATIENT
Start: 2022-06-11 | End: 2022-06-13

## 2022-06-11 NOTE — ED QUICK NOTES
Orders for admission, patient is aware of plan and ready to go upstairs. Any questions, please call ED RN Juni Patterson at extension [phone].      Patient Covid vaccination status: Fully vaccinated     COVID Test Ordered in ED: Rapid SARS-CoV-2 by PCR    COVID Suspicion at Admission: Low clinical suspicion for COVID    Running Infusions:      Mental Status/LOC at time of transport: AOX4    Other pertinent information:   CIWA score: N/A   NIH score:  N/A

## 2022-06-11 NOTE — PROGRESS NOTES
NURSING ADMISSION NOTE      Patient admitted via Cart  Oriented to room. Safety precautions initiated. Bed in low position. Call light in reach. VSS afebrile. Denies nausea or emesis. Denies pain. Note lips swollen. Red rash to back and chest.  Patient states that her throat feels swollen and a little hard to swallow. No SOB or difficulty breathing noted. Solumedrol ordered.

## 2022-06-12 LAB
ADENOVIRUS PCR:: NOT DETECTED
B PARAPERT DNA SPEC QL NAA+PROBE: NOT DETECTED
B PERT DNA SPEC QL NAA+PROBE: NOT DETECTED
C PNEUM DNA SPEC QL NAA+PROBE: NOT DETECTED
CORONAVIRUS 229E PCR:: NOT DETECTED
CORONAVIRUS HKU1 PCR:: NOT DETECTED
CORONAVIRUS NL63 PCR:: NOT DETECTED
CORONAVIRUS OC43 PCR:: NOT DETECTED
FLUAV RNA SPEC QL NAA+PROBE: NOT DETECTED
FLUBV RNA SPEC QL NAA+PROBE: NOT DETECTED
METAPNEUMOVIRUS PCR:: NOT DETECTED
MYCOPLASMA PNEUMONIA PCR:: NOT DETECTED
PARAINFLUENZA 1 PCR:: NOT DETECTED
PARAINFLUENZA 2 PCR:: NOT DETECTED
PARAINFLUENZA 3 PCR:: NOT DETECTED
PARAINFLUENZA 4 PCR:: NOT DETECTED
RHINOVIRUS/ENTERO PCR:: NOT DETECTED
RSV RNA SPEC QL NAA+PROBE: NOT DETECTED
SARS-COV-2 RNA NPH QL NAA+NON-PROBE: DETECTED
T PALLIDUM AB SER QL IA: NONREACTIVE

## 2022-06-12 PROCEDURE — 99232 SBSQ HOSP IP/OBS MODERATE 35: CPT | Performed by: HOSPITALIST

## 2022-06-12 RX ORDER — MORPHINE SULFATE 2 MG/ML
2 INJECTION, SOLUTION INTRAMUSCULAR; INTRAVENOUS EVERY 4 HOURS PRN
Status: DISCONTINUED | OUTPATIENT
Start: 2022-06-12 | End: 2022-06-13

## 2022-06-12 RX ORDER — LIDOCAINE HYDROCHLORIDE 20 MG/ML
5 SOLUTION OROPHARYNGEAL
Status: DISCONTINUED | OUTPATIENT
Start: 2022-06-12 | End: 2022-06-13

## 2022-06-12 RX ORDER — SODIUM CHLORIDE 9 MG/ML
INJECTION, SOLUTION INTRAVENOUS CONTINUOUS
Status: DISCONTINUED | OUTPATIENT
Start: 2022-06-12 | End: 2022-06-12

## 2022-06-12 NOTE — PLAN OF CARE
Patient is alert and oriented. On room air. Patient denies SOB. Patient states difficulty swallowing breakfast. Ears are swollen and red. Rash/ hives noted to the chest and upper back. Patient noted to have dry, cracked, swollen, lips. Voiding freely. On IV steroids. Receiving IVF. On low-fiber diet. Patient ambulating the halls. Plan of care updated with patient. Patient verbalized understanding.      Problem: Patient/Family Goals  Goal: Patient/Family Long Term Goal  Description: Patient's Long Term Goal: Discharge Home    Interventions:  - Pain Tolerance  -Return to normal ADL's    - See additional Care Plan goals for specific interventions  Outcome: Progressing  Goal: Patient/Family Short Term Goal  Description: Patient's Short Term Goal: Prepare to Discharge Home    Interventions:   - Prn lidocaine viscous  -IV steroids  -Derm and ID consult     - See additional Care Plan goals for specific interventions  Outcome: Progressing

## 2022-06-12 NOTE — PROGRESS NOTES
Discussed with Dr. 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 dermatology group paged- they do not have hospital privileges    Edwin Taylor M.D. [address] to Dr. Aimee Lawson they only see established patients as inpatient consults    Spoke to Dr. Kailey Palomino with [facility] dermatology who said IVIG can be used but she does not see patients in the hospital    ENRIQUE Shine Hospitalist    Addendum    Patient's rash is spreading    Also found to be COVID positive    Given that I am unable to get dermatology here to see patient and that patient's rash is spreading I feel like she would be best served at a tertiary center where she is able to see dermatology. She reported that she was not taking sertraline for a year now but was on carbamazepine prior to coming in,       I spoke to patient about transferring and she is agreeable, discussed with transfer center    Edwin Taylor M.D.   Sandra Pruffi

## 2022-06-12 NOTE — PLAN OF CARE
Upon assessment, pt is resting in bed. A&O x4, VSS, tolerating RA. Pt denies chest pain, sob, n/v, or pain. IVFs infusing, IV site clean/dry/intact. Face is swollen & edematous. Lips are dry & cracked. Eyes are reddened & swollen. Generalized rash to chest & back. Pt states she has pain when swallowing, but no difficulty breathing. PRN benadryl & artificial tears given per MAR. Pt updated on poc & instructed to use call light if in need; verbalized understanding. Call light within reach.      Problem: Patient/Family Goals  Goal: Patient/Family Long Term Goal  Description: Patient's Long Term Goal: Discharge    Interventions:  - IVFs  - Scheduled Solu-medrol  - Pain management  - See additional Care Plan goals for specific interventions  Outcome: Progressing  Goal: Patient/Family Short Term Goal  Description: Patient's Short Term Goal: Comfort care    Interventions:   - PRN benadryl  - PRN eyedrops  - See additional Care Plan goals for specific interventions  Outcome: Progressing

## 2022-06-12 NOTE — CM/SW NOTE
Care Coordination [facility] Transfer Note:  Reason for transfer:     Higher level of Care    Request initiated by:  Dr. Rosa Lloyd issue:    1. 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 -reports only taking Trazodone and no other meds (asked pt multiple times, reports not taking sertraline or zolpidem currently)  -check HSV PCR from oral lesions, check HIV and RPR  -check VRP (includes mycoplasma), although lack of resp symptoms makes it less likely  -start acyclovir empirically, need to assure adequate hydration while on this drug  -needs derm eval       Anticipated Transfer Plan:   [facility] and spoke to Nancy. [phone]. Faxed face sheet to [phone]. They want the photos sent to [email or web address]. Emailed  Photos. Waiting to hear back if they got them and could see them properly. 6:10PM:  [facility] again. They have not gained financial clearance because they are back up and short staffed. Additionally, she said burn unit cannot open the images. Resent the images.

## 2022-06-13 ENCOUNTER — TELEPHONE (OUTPATIENT)
Dept: INTERNAL MEDICINE CLINIC | Facility: CLINIC | Age: 42
End: 2022-06-13

## 2022-06-13 VITALS
HEIGHT: 64 IN | HEART RATE: 75 BPM | SYSTOLIC BLOOD PRESSURE: 115 MMHG | BODY MASS INDEX: 23.9 KG/M2 | DIASTOLIC BLOOD PRESSURE: 78 MMHG | RESPIRATION RATE: 20 BRPM | TEMPERATURE: 98 F | WEIGHT: 140 LBS | OXYGEN SATURATION: 98 %

## 2022-06-13 LAB
EBV NA IGG SER QL IA: POSITIVE
EBV VCA IGG SER QL IA: POSITIVE
EBV VCA IGM SER QL IA: NEGATIVE
HSV1 DNA SPEC QL NAA+PROBE: NEGATIVE
HSV2 DNA SPEC QL NAA+PROBE: NEGATIVE

## 2022-06-13 NOTE — CM/SW NOTE
SHAYE called [facility] transfer center and spoke with Jose [facility] does have a burn unit in the event it is decided patient has SJS - however informed Grayson Iglesias MD requesting transfer to general medical bed at this time for patient. Per Grayson Iglesias he will call SHAYE back. Grayson Iglesias requests face sheet be faxed to [phone] [facility] faxed face sheet - confirmation rec'd. Will await Ricky's call back.

## 2022-06-13 NOTE — PROGRESS NOTES
Patient transferred around 1700 to PMU. Updated on POC. Dr. Tulio Diane paged with an update on new generalized weakness. No new orders regarding that. Patient fluids decreased.

## 2022-06-13 NOTE — CM/SW NOTE
Ricky from [facility] [employer] calling stating Dr. Alli Coffey has accepted patient - patient going to [address] [place] [address] located at [address] 4[address] Count includes the [facility] 404 RN to RN# [phone].    7658:  Júnior Vazquez RN notified of the above. Informed Júnior Vazquez RN copy of chart and radiology CD go with patient upon transfer. ERCM also informed Cailin danielle RN ERCM will arrange transportation and complete PCS in [facility]. ERCM informed Júnior Vazquez RN to print completed PCS and face sheet and give to [facility] Ambulance crew upon their arrival. Júnior Vazquez RN v/u.    5113:  ALS arranged via [employer] 1122. PCS completed. Júnior Vazquez RN notified of the above. Ricky at [facility] [employer] transfer center also updated on ALS ETA to [facility].

## 2022-06-13 NOTE — CM/SW NOTE
0013: Paged Dr. Jena Byrnes via [employer] to call ERCM back re: transfer to [facility]    0026: No return call from Dr. Jena Byrnes - repaged Dr. Jena Byrnes via [employer]. [facility]. emailed pictures to [email or web address] in secure email per Kylie Brasher at [facility] transfer centers request.    9606: Dr. Jena Byrnes called back - SHAYE transferred her to [facility] @ [facility] @ [phone].    0725: Kylie Brasher at [facility] states the Charge RN of Burn ICU states she did not receive email of pt's pictures sent at [phone] [facility] email to [email or web address].    0109:  Ricky at [facility] states the Burn Service would like to speak with Dr. Jena Byrnes. Also per Kylie Brasher the Burn ICU Charge RN still has not received the pt's pictures - Ricky requests [facility]. email pictures to [email or web address]    0112:  Dr. Jena Byrnes paged via [employer] to call ERCM back as [facility] burn service would like to speak with her. 0123:   SHAYE emailed pictures in secure email to [email or web address].    0129:   Kylie Brasher at [facility] called back stating the Burn Resident informed him they did receive pt's pictures. Dr. Jena Byrnes returning page - transferred Dr. Jena Byrnes to [facility] at [facility] to speak with Burn Service MD. Latif Loss:  [facility]. called [facility] [place] in ER xray requesting pt's PCXR be printed onto radiology CD for transfer. Per Becky Black will be 45MIN before it will be ready. \" ERCM informed Anna Spring that process typically takes approx 15min and this is for probable transfer of patient to [facility] - per Malena Velez are only 2 of us down here at night and I can not leave my patient, we'll do the best we can. \" ERCM informed pt's floor RN Yeni Fletcher of probable transfer of patient to [facility] and informed her of the above and to please call [facility] [place] in ER xray at [id number]:[phone] in approx 20MIN to see if radiology CD ready for . Also per Keila,floor RN patient has 0.9% NS infusing @ 83cc/hr and patient is currently on RA. Informed Noemi Carrillo  to ensure copy of pt's chart is sent with patient upon transfer with Radiology CD.    0141:  Dr. Jena Byrnes calling stating the Burn Resident is going to speak with his attending and call Dr. Jena Byrnes back. Per Dr. Jena Byrnes if the Burn Service declines transfer we need to attempt to get in touch with the General Medicine Service which is what [facility]. initially requested with Ricky at [facility]. 8568:  Dr. Jena Byrnes calling states the Burn Service is declining patient however the Burn Resident is going to reach out to [facility] at [facility] and inform him we need General Medicine Bed for patient and to have General Medicine Service get in touch with Dr. Jena Byrnes. 8481: Ricky from [facility] calling stating Edgar Deleon has accepted patient. [facility] also states pt's insurance needs to be verified and that department has one hour to get pt's insurance verified, however [facility] also states he has 2-3 patients waiting for hospitalist bed in front of this patient - per [facility] he recommends Daytime Transfer Center RN call [facility] transfer [place] around 0800 this AM to obtain bed status update. Dr. Jena Byrnes and Darlene Farfan RN updated on the above.  [facility] will inform transfer center of pt's acceptance and to follow up with [facility] transfer center around 0800 this AM.

## 2022-06-13 NOTE — BH RN DISCHARGE NOTE
NURSING DISCHARGE NOTE    Discharged  via Ambulance. Accompanied by self   Belongings Personal bag, medications, phone/. 9939: Ambulance arrived to  patient and transfer to [address] [place] [address]. Dr. Zulema Olsen of [place] [address] [address]  RN to RN# [phone]. [facility] [place] [facility] hospitalist updated, discharge order in place. Discharge navigator completed. Printed all discharge documents and placed in a envelope with patient's chart, radiology CD, face sheet given. IV removed. 0730: Report given to Lynsey. Patient taken by ambulance.

## 2022-06-13 NOTE — PLAN OF CARE
Problem: Rashes, Covid  Data: Axo4. Telemetry- sinus rhythm. , on room air. 02 sats 96%. Denied pain. Afebrile. Rashes on face, upper extremities and chest. Eye dryness, eye drops given. Left second toe slightly swollen. Oral ulcer in mouth, magic mouthwash given. Continent. Voids. Regular diet, Low fiber. IVF 0.9 Nacl @ 83ml/hr. Up with sba. _ Patient has been accepted to [facility], pending insurance verification . Follow up at 0800 am to check on status of bed. CD of X-ray in chart. Intervention: IV solu medrol, IV acyclovir, melatonin. Education: Medications, call light   Response: Cooperative with care. Problem: Patient/Family Goals  Goal: Patient/Family Long Term Goal  Description: Patient's Long Term Goal: Discharge Home    Interventions:  - Pain Tolerance  -Return to normal ADL's    - See additional Care Plan goals for specific interventions  Outcome: Progressing  Goal: Patient/Family Short Term Goal  Description: Patient's Short Term Goal: Prepare to Discharge Home  6/12 noc: sleep.      Interventions:   - Prn lidocaine viscous  -IV steroids  -Derm and ID consult     - See additional Care Plan goals for specific interventions  Outcome: Progressing     Problem: SKIN/TISSUE INTEGRITY - ADULT  Goal: Skin integrity remains intact  Description: INTERVENTIONS  - Assess and document risk factors for pressure ulcer development  - Assess and document skin integrity  - Monitor for areas of redness and/or skin breakdown  - Initiate interventions, skin care algorithm/standards of care as needed  Outcome: Progressing  Goal: Incision(s), wounds(s) or drain site(s) healing without S/S of infection  Description: INTERVENTIONS:  - Assess and document risk factors for pressure ulcer development  - Assess and document skin integrity  - Assess and document dressing/incision, wound bed, drain sites and surrounding tissue  - Implement wound care per orders  - Initiate isolation precautions as appropriate  - Initiate Pressure Ulcer prevention bundle as indicated  Outcome: Progressing  Goal: Oral mucous membranes remain intact  Description: INTERVENTIONS  - Assess oral mucosa and hygiene practices  - Implement preventative oral hygiene regimen  - Implement oral medicated treatments as ordered  Outcome: Progressing

## 2022-06-13 NOTE — CM/SW NOTE
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 1958:  ERCM left message for Dr. Sullivan Smoke her of the above and to call ERCM back with how she would like to proceed. 2011:  ERCM spoke with Dr. Ya Iverson - per Dr. Ya Iverson she did not inform transfer center patient needed to go to Burn Unit - per Dr. Ya Iverson patient is ok to go to general medical bed but does require transfer to tertiary care center in the event it is SJS, also per Dr. Ya Iverson she attempted to C/S six dermatology groups today and none of them were agreeable to see patient so patient requires transfer as we do not have dermatology group to be able to see patient at this time. Also per Dr. Ya Iverson patient unable to eat or drink at this time. Dr. Ya Iverson requests ERCM attempt to transfer patient to [facility] to a general Medical bed. ERCM will call [facility] re: this above. 2014:  SHAYE called and spoke with Elvia Balderas at [facility] re: transferring patient to [address] [place] [address] per Elvia Balderas they are on transfer hold at this time - Elvia Balderas states he will put patient back on the transfer request list however he recommends we start calling other places if we need patient transferred as they have been on transfer hold for 4 days. 2016:  LM for Dr. Ya Iverson to call [facility], [facility]. back re: the above. 2019:  ERCM informed Dr. Ya Iverson of the above per Elvia Balderas at [facility]. Dr. Ya Iverson requests ERCM to attempt to transfer patient to tertiary care center [facility], [facility] or [facility]. Per Dr. Ya Iverson ok to contact her up until 2120 this evening if able to get accepting facility for her to do MD to MD report, if after 2120 Dr. Ya Iverson requests ERCM to contact Dr. Bridget Rivera ([facility] tonight) as she is aware of pt's case.

## 2022-06-13 NOTE — CM/SW NOTE
Ricky from [facility] transfer center calling back wondering if we still need to transfer patient - informed Julio Gorman we are still in need of transfer. Per Julio Gorman he would like to speak with Dr. Marciano Lujan re: patient and he would like [facility] to email pictures of patient's to burn. [email or web address].

## 2022-06-13 NOTE — TELEPHONE ENCOUNTER
Patient to be discharged home from [address] [place] [address] 6/15/22. Admitted for chest pain and burn to R arm. To fax records for Dr. Michael Sharma review.     Future Appointments   Date Time Provider Christian Joy   6/29/2022  1:45 PM Praneeth Morales MD [facility] [place]

## 2022-06-29 ENCOUNTER — OFFICE VISIT (OUTPATIENT)
Dept: INTERNAL MEDICINE CLINIC | Facility: CLINIC | Age: 42
End: 2022-06-29
Payer: COMMERCIAL

## 2022-06-29 VITALS
WEIGHT: 140 LBS | DIASTOLIC BLOOD PRESSURE: 80 MMHG | TEMPERATURE: 98 F | BODY MASS INDEX: 23.9 KG/M2 | HEIGHT: 64 IN | SYSTOLIC BLOOD PRESSURE: 106 MMHG | HEART RATE: 77 BPM | OXYGEN SATURATION: 97 %

## 2022-06-29 DIAGNOSIS — L81.9 POST-INFLAMMATORY PIGMENTARY CHANGES: ICD-10-CM

## 2022-06-29 DIAGNOSIS — L51.1 STEVENS-JOHNSON SYNDROME (HCC): Primary | ICD-10-CM

## 2022-06-29 DIAGNOSIS — Z48.02 VISIT FOR SUTURE REMOVAL: ICD-10-CM

## 2022-06-29 DIAGNOSIS — F51.01 PRIMARY INSOMNIA: ICD-10-CM

## 2022-06-29 PROCEDURE — 3008F BODY MASS INDEX DOCD: CPT | Performed by: FAMILY MEDICINE

## 2022-06-29 PROCEDURE — 3079F DIAST BP 80-89 MM HG: CPT | Performed by: FAMILY MEDICINE

## 2022-06-29 PROCEDURE — 3074F SYST BP LT 130 MM HG: CPT | Performed by: FAMILY MEDICINE

## 2022-06-29 PROCEDURE — 99024 POSTOP FOLLOW-UP VISIT: CPT | Performed by: FAMILY MEDICINE

## 2022-06-29 PROCEDURE — 99495 TRANSJ CARE MGMT MOD F2F 14D: CPT | Performed by: FAMILY MEDICINE

## 2022-06-29 RX ORDER — MOXIFLOXACIN 5 MG/ML
1 SOLUTION/ DROPS OPHTHALMIC EVERY 4 HOURS
COMMUNITY
Start: 2022-06-20

## 2022-06-29 RX ORDER — BETAMETHASONE DIPROPIONATE 0.05 %
1 OINTMENT (GRAM) TOPICAL 2 TIMES DAILY
COMMUNITY
Start: 2022-06-20

## 2022-06-29 RX ORDER — CARBOXYMETHYLCELLULOSE SODIUM 5 MG/ML
1-2 SOLUTION/ DROPS OPHTHALMIC EVERY 4 HOURS PRN
COMMUNITY
Start: 2022-06-20

## 2022-06-29 RX ORDER — TRAZODONE HYDROCHLORIDE 100 MG/1
100 TABLET ORAL NIGHTLY PRN
Qty: 30 TABLET | Refills: 0 | Status: SHIPPED | OUTPATIENT
Start: 2022-06-29

## 2022-06-29 RX ORDER — PREDNISOLONE ACETATE 10 MG/ML
1 SUSPENSION/ DROPS OPHTHALMIC EVERY 4 HOURS
COMMUNITY
Start: 2022-06-20

## 2022-07-28 RX ORDER — TRAZODONE HYDROCHLORIDE 100 MG/1
100 TABLET ORAL NIGHTLY PRN
Qty: 30 TABLET | Refills: 0 | Status: SHIPPED | OUTPATIENT
Start: 2022-07-28

## 2022-08-24 RX ORDER — TRAZODONE HYDROCHLORIDE 100 MG/1
100 TABLET ORAL NIGHTLY PRN
Qty: 30 TABLET | Refills: 0 | Status: SHIPPED | OUTPATIENT
Start: 2022-08-24

## 2022-09-25 RX ORDER — TRAZODONE HYDROCHLORIDE 100 MG/1
100 TABLET ORAL NIGHTLY PRN
Qty: 30 TABLET | Refills: 0 | Status: SHIPPED | OUTPATIENT
Start: 2022-09-25

## 2022-09-26 RX ORDER — TRAZODONE HYDROCHLORIDE 100 MG/1
TABLET ORAL
Qty: 30 TABLET | Refills: 0 | OUTPATIENT
Start: 2022-09-26

## 2022-10-24 RX ORDER — TRAZODONE HYDROCHLORIDE 100 MG/1
100 TABLET ORAL NIGHTLY PRN
Qty: 30 TABLET | Refills: 0 | Status: SHIPPED | OUTPATIENT
Start: 2022-10-24

## 2022-11-21 RX ORDER — TRAZODONE HYDROCHLORIDE 100 MG/1
100 TABLET ORAL NIGHTLY PRN
Qty: 30 TABLET | Refills: 0 | Status: SHIPPED | OUTPATIENT
Start: 2022-11-21

## 2022-12-27 RX ORDER — TRAZODONE HYDROCHLORIDE 100 MG/1
100 TABLET ORAL NIGHTLY PRN
Qty: 30 TABLET | Refills: 0 | Status: SHIPPED | OUTPATIENT
Start: 2022-12-27

## 2023-01-26 RX ORDER — TRAZODONE HYDROCHLORIDE 100 MG/1
100 TABLET ORAL NIGHTLY PRN
Qty: 30 TABLET | Refills: 0 | Status: SHIPPED | OUTPATIENT
Start: 2023-01-26

## 2023-02-27 RX ORDER — TRAZODONE HYDROCHLORIDE 100 MG/1
100 TABLET ORAL NIGHTLY PRN
Qty: 30 TABLET | Refills: 0 | Status: SHIPPED | OUTPATIENT
Start: 2023-02-27

## 2023-03-23 RX ORDER — TRAZODONE HYDROCHLORIDE 100 MG/1
100 TABLET ORAL NIGHTLY PRN
Qty: 30 TABLET | Refills: 0 | Status: SHIPPED | OUTPATIENT
Start: 2023-03-23

## 2023-04-21 RX ORDER — TRAZODONE HYDROCHLORIDE 100 MG/1
100 TABLET ORAL NIGHTLY PRN
Qty: 30 TABLET | Refills: 0 | Status: SHIPPED | OUTPATIENT
Start: 2023-04-21

## 2023-05-18 RX ORDER — TRAZODONE HYDROCHLORIDE 100 MG/1
100 TABLET ORAL NIGHTLY PRN
Qty: 30 TABLET | Refills: 0 | Status: SHIPPED | OUTPATIENT
Start: 2023-05-18

## 2023-06-19 RX ORDER — TRAZODONE HYDROCHLORIDE 100 MG/1
100 TABLET ORAL NIGHTLY PRN
Qty: 30 TABLET | Refills: 0 | Status: SHIPPED | OUTPATIENT
Start: 2023-06-19

## 2023-06-19 NOTE — TELEPHONE ENCOUNTER
Pt does not understand why she has to schedule an appt. Explained to pt this month will make a year since she was last seen.     Pt demanding to speak with MA

## 2023-07-05 PROBLEM — T78.40XD ALLERGIC REACTION, SUBSEQUENT ENCOUNTER: Status: RESOLVED | Noted: 2022-06-11 | Resolved: 2023-07-05

## 2023-07-05 PROBLEM — R13.10 ODYNOPHAGIA: Status: RESOLVED | Noted: 2022-06-11 | Resolved: 2023-07-05

## 2023-07-05 PROBLEM — K12.1 ORAL ULCER: Status: RESOLVED | Noted: 2022-06-11 | Resolved: 2023-07-05

## 2023-07-10 ENCOUNTER — HOSPITAL ENCOUNTER (OUTPATIENT)
Dept: MAMMOGRAPHY | Age: 43
Discharge: HOME | End: 2023-07-10
Attending: FAMILY MEDICINE
Payer: COMMERCIAL

## 2023-07-10 DIAGNOSIS — Z12.31 VISIT FOR SCREENING MAMMOGRAM: ICD-10-CM

## 2023-07-10 PROCEDURE — 77067 SCR MAMMO BI INCL CAD: CPT | Performed by: FAMILY MEDICINE

## 2023-07-10 PROCEDURE — 77063 BREAST TOMOSYNTHESIS BI: CPT | Performed by: FAMILY MEDICINE

## 2023-07-11 ENCOUNTER — TELEPHONE (OUTPATIENT)
Dept: INTERNAL MEDICINE CLINIC | Facility: CLINIC | Age: 43
End: 2023-07-11

## 2023-07-11 ENCOUNTER — ANCILLARY ORDERS (OUTPATIENT)
Dept: INTERNAL MEDICINE CLINIC | Facility: CLINIC | Age: 43
End: 2023-07-11

## 2023-07-11 DIAGNOSIS — R92.2 INCONCLUSIVE MAMMOGRAM: Primary | ICD-10-CM

## 2023-07-11 NOTE — TELEPHONE ENCOUNTER
Spoke with pt. Explained dense breast tissue and the importance of getting additional views for left breast as dense tissue can make it harder to detect abnormalities. Stated it is also the recommendation of TO. Pt still declined, will not get additional views at this time.      ANGEL

## 2023-07-17 NOTE — TELEPHONE ENCOUNTER
Spoke with pt again. Informed her TO wanted VV or OV to go over results. She said she didn't want to set up VV because it would be the same cost aa OV. $25 co-pay, but in the past she's been billed $100. She stated she can't even afford anymore co-pays. She asked if something was \"wrong\" with the results. Informed her they were \"inconclusive\", they didn't have good pictures due to the dense tissue. Thus additional views were needed. Pt refused to set up appt and said she'd think about going back for imaging. Advised pt to call her insurance and ask them how much additional views would be since order is already in. And to ask how much VV would be    TO: would you like pt to be called back in a few days for f/u if pt called her insurance?

## 2023-08-03 RX ORDER — TRAZODONE HYDROCHLORIDE 150 MG/1
150 TABLET ORAL NIGHTLY PRN
Qty: 30 TABLET | Refills: 0 | Status: SHIPPED | OUTPATIENT
Start: 2023-08-03

## 2023-08-03 NOTE — TELEPHONE ENCOUNTER
Requesting        traZODone 150 MG Oral Tab          Sig: Take 1 tablet (150 mg total) by mouth nightly as needed for Sleep. Disp: 30 tablet    Refills: 0    Start: 8/3/2023    Class: Normal    Non-formulary    Last ordered: 4 weeks ago by Fredy Holcomb MD        LOV: 7/5/2023  RTC: none noted   Last Relevant Labs: n/a   Filled: 7/5/2023 #30 with 0 refills    No future appointments.

## 2023-09-01 RX ORDER — TRAZODONE HYDROCHLORIDE 150 MG/1
150 TABLET ORAL NIGHTLY PRN
Qty: 30 TABLET | Refills: 0 | Status: SHIPPED | OUTPATIENT
Start: 2023-09-01

## 2023-09-28 RX ORDER — TRAZODONE HYDROCHLORIDE 150 MG/1
150 TABLET ORAL NIGHTLY PRN
Qty: 30 TABLET | Refills: 0 | Status: SHIPPED | OUTPATIENT
Start: 2023-09-28

## 2023-09-28 NOTE — TELEPHONE ENCOUNTER
Requesting   traZODone 150 MG Oral Tab          Sig: Take 1 tablet (150 mg total) by mouth nightly as needed for Sleep. Disp: 30 tablet    Refills: 0    Start: 9/28/2023    Class: Normal    Non-formulary    Last ordered: 3 weeks ago (9/1/2023) by Tere Mireles MD     LOV: 7/5/2023  RTC: none noted   Last Relevant Labs: n/a   Filled: 9/1/2023 #30 with 0 refills    No future appointments.

## 2023-10-25 RX ORDER — TRAZODONE HYDROCHLORIDE 150 MG/1
150 TABLET ORAL NIGHTLY PRN
Qty: 30 TABLET | Refills: 0 | Status: SHIPPED | OUTPATIENT
Start: 2023-10-25

## 2023-10-25 NOTE — TELEPHONE ENCOUNTER
Requesting    traZODone 150 MG Oral Tab         Sig: Take 1 tablet (150 mg total) by mouth nightly as needed for Sleep. Disp: 30 tablet    Refills: 0    Start: 10/25/2023    Class: Normal    Non-formulary    Last ordered: 3 weeks ago (9/28/2023) by Gigi Crowley MD     LOV: 7/5/2023  RTC: none noted   Last Relevant Labs: n/a  Filled: 9/28/2023 #30 with 0 refills    No future appointments.

## 2023-11-21 RX ORDER — TRAZODONE HYDROCHLORIDE 150 MG/1
150 TABLET ORAL NIGHTLY PRN
Qty: 30 TABLET | Refills: 0 | Status: SHIPPED | OUTPATIENT
Start: 2023-11-21

## 2023-12-18 RX ORDER — TRAZODONE HYDROCHLORIDE 150 MG/1
150 TABLET ORAL NIGHTLY PRN
Qty: 30 TABLET | Refills: 0 | Status: SHIPPED | OUTPATIENT
Start: 2023-12-18

## 2023-12-18 NOTE — TELEPHONE ENCOUNTER
Requesting    traZODone 150 MG Oral Tab         Sig: Take 1 tablet (150 mg total) by mouth nightly as needed for Sleep. Disp: 30 tablet    Refills: 0    Start: 12/18/2023    Class: Normal    Non-formulary    Last ordered: 3 weeks ago (11/21/2023) by Richy Garcia MD     LOV: 7/5/2023  RTC: none noted   Last Relevant Labs: n/a   Filled: 11/21/2023 #30 with 0 refills    No future appointments.

## 2024-01-19 RX ORDER — TRAZODONE HYDROCHLORIDE 150 MG/1
150 TABLET ORAL NIGHTLY PRN
Qty: 30 TABLET | Refills: 0 | Status: SHIPPED | OUTPATIENT
Start: 2024-01-19

## 2024-01-19 NOTE — TELEPHONE ENCOUNTER
Requesting    traZODone 150 MG Oral Tab         Sig: Take 1 tablet (150 mg total) by mouth nightly as needed for Sleep.    Disp: 30 tablet    Refills: 0    Start: 1/18/2024    Class: Normal    Non-formulary    Last ordered: 1 month ago (12/18/2023) by Tato Bautista MD     LOV: 7/5/2023  RTC: none noted   Last Relevant Labs: n/a   Filled: 12/18/2023 #30 with 0 refills    No future appointments.

## 2024-02-15 RX ORDER — TRAZODONE HYDROCHLORIDE 150 MG/1
150 TABLET ORAL NIGHTLY PRN
Qty: 30 TABLET | Refills: 0 | Status: SHIPPED | OUTPATIENT
Start: 2024-02-15

## 2024-02-15 NOTE — TELEPHONE ENCOUNTER
Requesting   traZODone 150 MG Oral Tab          Sig: Take 1 tablet (150 mg total) by mouth nightly as needed for Sleep.    Disp: 30 tablet    Refills: 0    Start: 2/14/2024    Class: Normal    Non-formulary    Last ordered: 3 weeks ago (1/19/2024) by Tato Bautista MD     LOV: 7/5/2023  RTC: none noted   Last Relevant Labs: n.a   Filled: 1/19/2024 #30 with 0 refills    No future appointments.

## 2024-03-13 RX ORDER — TRAZODONE HYDROCHLORIDE 150 MG/1
150 TABLET ORAL NIGHTLY PRN
Qty: 30 TABLET | Refills: 0 | Status: SHIPPED | OUTPATIENT
Start: 2024-03-13

## 2024-03-13 NOTE — TELEPHONE ENCOUNTER
Requesting   traZODone 150 MG Oral Tab          Sig: Take 1 tablet (150 mg total) by mouth nightly as needed for Sleep.    Disp: 30 tablet    Refills: 0    Start: 3/13/2024    Class: Normal    Non-formulary    Last ordered: 3 weeks ago (2/15/2024) by Tato Bautista MD     LOV: 7/5/2023  RTC: none noted   Last Relevant Labs: n/a   Filled: 2/15/2024 #30 with 0 refills    No future appointments.

## 2024-04-11 RX ORDER — TRAZODONE HYDROCHLORIDE 150 MG/1
150 TABLET ORAL NIGHTLY PRN
Qty: 30 TABLET | Refills: 0 | Status: SHIPPED | OUTPATIENT
Start: 2024-04-11

## 2024-04-11 NOTE — TELEPHONE ENCOUNTER
Requesting      traZODone 150 MG Oral Tab         Sig: Take 1 tablet (150 mg total) by mouth nightly as needed for Sleep.    Disp: 30 tablet    Refills: 0    Start: 4/10/2024    Class: Normal    Non-formulary    Last ordered: 4 weeks ago (3/13/2024) by Tato Bautista MD       To be filled at: [id number] [address] [facility] [phone], [phone]        LOV: 07/05/23 w/ TO   RTC: No Follow Up on Files   Last Relevant Labs: No Recent Labs   Filled: 03/13/24 #30 with 0 refills    No future appointments.

## 2024-05-06 RX ORDER — TRAZODONE HYDROCHLORIDE 150 MG/1
150 TABLET ORAL NIGHTLY PRN
Qty: 30 TABLET | Refills: 0 | Status: SHIPPED | OUTPATIENT
Start: 2024-05-06

## 2024-05-06 NOTE — TELEPHONE ENCOUNTER
Requesting   traZODone 150 MG Oral Tab          Sig: Take 1 tablet (150 mg total) by mouth nightly as needed for Sleep.    Disp: 30 tablet    Refills: 0    Start: 5/6/2024    Class: Normal    Non-formulary    Last ordered: 3 weeks ago (4/11/2024) by Tato Bautista MD     LOV: 7/5/2023  RTC: none noted   Last Relevant Labs: n/a   Filled: 4/11/2024 #30 with 0 refills    No future appointments.

## 2024-06-04 RX ORDER — TRAZODONE HYDROCHLORIDE 150 MG/1
150 TABLET ORAL NIGHTLY PRN
Qty: 30 TABLET | Refills: 0 | Status: SHIPPED | OUTPATIENT
Start: 2024-06-04

## 2024-06-04 NOTE — TELEPHONE ENCOUNTER
Message sent to pt to schedule an appt. Has not been seen since 7/5/23    Trazodone 150 mg  Filled 5-6-24  Qty 30  0 refills  No future appointments.  LOV 7-5-23 TO

## 2024-07-07 RX ORDER — TRAZODONE HYDROCHLORIDE 150 MG/1
150 TABLET ORAL NIGHTLY PRN
Qty: 30 TABLET | Refills: 0 | Status: SHIPPED | OUTPATIENT
Start: 2024-07-07

## 2024-08-05 NOTE — TELEPHONE ENCOUNTER
[facility] sent to patient to schedule OV     Requesting    traZODone 150 MG Oral Tab         Sig: Take 1 tablet (150 mg total) by mouth nightly as needed for Sleep.    Disp: 30 tablet    Refills: 0    Start: 8/5/2024    Class: Normal    Non-formulary    Last ordered: 4 weeks ago (7/7/2024) by Tato Bautista MD     LOV: 7/5/2023  RTC: none noted   Last Relevant Labs: n/a   Filled: 7/7/2024 #30 with 0 refills    No future appointments.

## 2024-08-07 RX ORDER — TRAZODONE HYDROCHLORIDE 150 MG/1
150 TABLET ORAL NIGHTLY PRN
Qty: 30 TABLET | Refills: 0 | Status: SHIPPED | OUTPATIENT
Start: 2024-08-07

## 2024-08-15 ENCOUNTER — OFFICE VISIT (OUTPATIENT)
Dept: INTERNAL MEDICINE CLINIC | Facility: CLINIC | Age: 44
End: 2024-08-15
Payer: COMMERCIAL

## 2024-08-15 VITALS
HEART RATE: 78 BPM | TEMPERATURE: 98 F | SYSTOLIC BLOOD PRESSURE: 102 MMHG | WEIGHT: 155.63 LBS | DIASTOLIC BLOOD PRESSURE: 76 MMHG | HEIGHT: 63 IN | BODY MASS INDEX: 27.57 KG/M2 | OXYGEN SATURATION: 98 %

## 2024-08-15 DIAGNOSIS — Z00.00 WELLNESS EXAMINATION: Primary | ICD-10-CM

## 2024-08-15 DIAGNOSIS — J06.9 ACUTE URI: ICD-10-CM

## 2024-08-15 DIAGNOSIS — Z00.00 LABORATORY EXAM ORDERED AS PART OF ROUTINE GENERAL MEDICAL EXAMINATION: ICD-10-CM

## 2024-08-15 DIAGNOSIS — Z12.31 VISIT FOR SCREENING MAMMOGRAM: ICD-10-CM

## 2024-08-15 DIAGNOSIS — F51.01 PRIMARY INSOMNIA: ICD-10-CM

## 2024-08-15 PROCEDURE — 3008F BODY MASS INDEX DOCD: CPT | Performed by: FAMILY MEDICINE

## 2024-08-15 PROCEDURE — 99396 PREV VISIT EST AGE 40-64: CPT | Performed by: FAMILY MEDICINE

## 2024-08-15 PROCEDURE — 3074F SYST BP LT 130 MM HG: CPT | Performed by: FAMILY MEDICINE

## 2024-08-15 PROCEDURE — 99213 OFFICE O/P EST LOW 20 MIN: CPT | Performed by: FAMILY MEDICINE

## 2024-08-15 PROCEDURE — 3078F DIAST BP <80 MM HG: CPT | Performed by: FAMILY MEDICINE

## 2024-08-15 RX ORDER — SUVOREXANT 10 MG/1
10 TABLET, FILM COATED ORAL NIGHTLY PRN
Qty: 30 TABLET | Refills: 0 | Status: SHIPPED | OUTPATIENT
Start: 2024-08-15 | End: 2024-09-14

## 2024-08-15 NOTE — PROGRESS NOTES
HPI:   Afshan Stanton is a 44 year old female who presents for a complete physical exam.     Wt Readings from Last 6 Encounters:   08/15/24 155 lb 9.6 oz (70.6 kg)   07/05/23 149 lb 9.6 oz (67.9 kg)   06/29/22 140 lb (63.5 kg)   06/11/22 140 lb (63.5 kg)   06/11/22 140 lb (63.5 kg)   09/16/21 146 lb 6.4 oz (66.4 kg)     Body mass index is 27.56 kg/m².     Cholesterol, Total (mg/dL)   Date Value   08/06/2020 209 (H)     HDL Cholesterol (mg/dL)   Date Value   08/06/2020 48     LDL Cholesterol (mg/dL)   Date Value   08/06/2020 150 (H)     AST (U/L)   Date Value   06/11/2022 27   08/06/2020 16   05/01/2015 17     ALT (U/L)   Date Value   06/11/2022 26   08/06/2020 19   05/01/2015 23        Current Outpatient Medications   Medication Sig Dispense Refill    Suvorexant (BELSOMRA) 10 MG Oral Tab Take 10 mg by mouth nightly as needed. 30 tablet 0      Past Medical History:    Ovarian cyst      Past Surgical History:   Procedure Laterality Date    Other surgical history N/A 5/1/2015    Procedure: ESOPHAGOGASTRODUODENOSCOPY (EGD);  Surgeon: Erin Nelson MD;  Location:  ENDOSCOPY      Family History   Problem Relation Age of Onset    Hypertension Father     Diabetes Mother     Lipids Mother       Social History:   Social History     Socioeconomic History    Marital status:    Tobacco Use    Smoking status: Never    Smokeless tobacco: Never   Vaping Use    Vaping status: Never Used   Substance and Sexual Activity    Alcohol use: No     Alcohol/week: 0.0 standard drinks of alcohol    Drug use: No   Other Topics Concern    Caffeine Concern Yes     Comment: 1 cup of coffee every morning    Exercise No     .        REVIEW OF SYSTEMS:   GENERAL: feels well otherwise  SKIN: denies rash  HEENT: has had nasal maryann for the last day   no sore throat   no ear ache  occ cough    no f/c/aches     LUNGS: denies shortness of breath  No cough  CARDIOVASCULAR: denies chest pain on exertion  No palpitations    GI: denies  abdominal pain  : denies dysuria   NEURO: denies headaches  PSYCHE: trazadone not helping as much anymore   lays in bad 1 hr before falls asleep       had tried restoril, ambien in the past     HEMATOLOGIC: denies hx of anemia  ENDOCRINE: denies thyroid history  ALL/ASTHMA: denies hx of allergy or asthma    EXAM:   /76 (BP Location: Left arm, Patient Position: Sitting, Cuff Size: adult)   Pulse 78   Temp 98 °F (36.7 °C) (Temporal)   Ht 5' 3\" (1.6 m)   Wt 155 lb 9.6 oz (70.6 kg)   SpO2 98%   BMI 27.56 kg/m²   Body mass index is 27.56 kg/m².   GENERAL: well developed, well nourished,in no apparent distress  SKIN: no rashes   HEENT: atraumatic, normocephalic,ears and throat are clear  NECK: supple,no adenopathy  LUNGS: clear to auscultation  CARDIO: RRR without murmur  GI: good BS's,no masses, HSM or tenderness  EXTREMITIES: no edema  NEURO: motor and sensory are grossly intact    ASSESSMENT AND PLAN:   Afshan Stanton is a 44 year old female who presents for a complete physical exam.  Health maintenance, will check fasting Lipids, CMP, TSH UA and CBC. MMG ordered   discussed the URI   no antibiotics needed  call if not better    discussed the sleep   will change to belsomra to see if helps better    call w update     The patient indicates understanding of these issues and agrees to the plan.  The patient is asked to return for CPX in 1yr.

## 2024-10-12 DIAGNOSIS — F51.01 PRIMARY INSOMNIA: ICD-10-CM

## 2024-10-13 RX ORDER — DARIDOREXANT 50 MG/1
50 TABLET, FILM COATED ORAL NIGHTLY
Qty: 30 TABLET | Refills: 0 | Status: SHIPPED | OUTPATIENT
Start: 2024-10-13

## 2024-11-12 NOTE — TELEPHONE ENCOUNTER
Spoke to patient who requests to stop taking quviviq and resume taking Trazodone for sleep. Patient states quviviq is often unavailable in her pharmacy.     Patient previously on Trazodone 150mg at bedtime but would like to increase dose if possible.     Please advise.

## 2024-11-14 RX ORDER — TRAZODONE HYDROCHLORIDE 150 MG/1
150 TABLET ORAL NIGHTLY PRN
Qty: 30 TABLET | Refills: 0 | Status: SHIPPED | OUTPATIENT
Start: 2024-11-14

## 2024-11-14 NOTE — TELEPHONE ENCOUNTER
TO: Pended trazadone 150 mg, will you refill until pt has sleep center consult?     Form faxed to [place] for sleep med  Fax # [phone]  Received confirmation.     Informed pt of TO's recs and that [facility] will call her to set up appt. Pt isn't sure she wants to do sleep consult. Informed pt to atleast set up consult for Insomnia and see what their next steps are.  Pt asked if he can refill Trazadone 150 mg until she has consult. Pt does not like the Quviviq.  Informed her we will discuss with TO and get back to her.

## (undated) NOTE — LETTER
9/10/2021      Madison Emil  [address] [id number]      RE: Appointment Needed for Continuous Medication Refill     Dear Madison Stein:    Your recent request for a medication refill has been sent to for review.     Your

## (undated) NOTE — IP AVS SNAPSHOT
[address]            (For Outpatient Use Only) Initial Admit Date: 2022   Inpt/Obs Admit Date: Inpt: 22 / Obs: 22   Discharge Date:    Jenny Drilling:  [de-identified]   MRN: [de-identified]   CSN: [id number]   CEID: [id number]        ENCOUNTER  Patient Class: Inpatient Admitting Provider: Eddie Archuleta MD Unit: [place] [address] Service: Medical Attending Provider: Ernesto Mckeon MD   Bed: 530-A   Visit Type:   Referring Physician: No ref. provider found Billing Flag:    Admit Diagnosis: Oral ulcer [K12.1]      PATIENT  Legal Name:   Vish Owens    Legal Sex: Female  Gender ID: Female             Pref Name:    PCP:  Azul Hardin MD Home: [phone]   Address:  [address] [place] [address] : 1980 (42 yrs) Mobile: [phone]         City[facility]/Zip: [facility] [address] Marital:  Language: [facility]: Will SSN4: [id number] Sikhism: Wishes Privacy     Race:  Ethnicity: Non  Or  O*   EMERGENCY CONTACT   Name Relationship Legal Guardian? Home Phone Work Phone Mobile Phone   1.  Clint Stanton  2. *No Contact Specified* Spouse      [phone]       GUARANTOR  Guarantor: Silvestre Alexandra : 1980 Home Phone: [phone]   Address: [address] [place] [address]  Sex: Female Work Phone:    City[facility]/Zip: [facility] [address]   Rel. to Patient: Self Guarantor ID: [id number]   [address]   Employer:  Status: NOT EMPLO*     COVERAGE  PRIMARY INSURANCE   [place] [employer] [place] Plan: Rohan Burch 150 PPO   Group Number: [id number] Insurance Type: [address] Name: Henry Concepcion : 1981   Subscriber ID: [id number] Pt Rel to Subscriber: Spouse   SECONDARY INSURANCE   Payor:  Plan:    Group Number:  Insurance Type:    Subscriber Name:  Subscriber :    Subscriber ID:  Pt Rel to Subscriber:    TERTIARY INSURANCE   Payor:  Plan:    Group Number:  Insurance Type:    Subscriber Name:  Subscriber :    Subscriber ID:  Pt Rel to Subscriber:    Hospital Account Financial Class: [facility]    June 13, 2022

## (undated) NOTE — LETTER
10/16/21        Ally Hernandez  [address] [facility]  [address] [facility]      Dear Max Rodriguez,    Our records indicate that you have outstanding lab work and or testing that was ordered for you and has not yet been completed:  Orders Placed This Encounter

## (undated) NOTE — LETTER
08/29/19        Alice Harry  [address] [place] [address]      Dear Nita Beck records indicate that you have outstanding lab work and or testing that was ordered for you and has not yet been completed: Fasting lab work   *fast for a

## (undated) NOTE — LETTER
Date & Time: 6/11/2022, 10:19 AM  Patient: Lissa Meeks  Encounter Provider(s):    Harsh Villalta MD         This certifies that Aranza Mancilla, a patient at an [facility] facility, am leaving the facility voluntarily and against the advice of my physician. I acknowledge that I have been:    1. informed that my physician believes that I need to receive care here;  2. informed that if I leave, I could become sicker or even die; and  3. provided discharge instructions consistent with my current diagnosis. I hereby release my physician, the facility, and its employees from all responsibility for any ill effects which may result from this action. __________________________________  Patient or authorized caregiver signature    __________________________________  RN signature    If no patient or patient representative signature was obtained, sign below to acknowledge that the form was reviewed with the patient and that the patient refused to sign.     __________________________________  RN signature